# Patient Record
Sex: MALE | Race: WHITE
[De-identification: names, ages, dates, MRNs, and addresses within clinical notes are randomized per-mention and may not be internally consistent; named-entity substitution may affect disease eponyms.]

---

## 2019-11-05 ENCOUNTER — HOSPITAL ENCOUNTER (OUTPATIENT)
Dept: HOSPITAL 39 - MRI | Age: 49
End: 2019-11-05
Attending: FAMILY MEDICINE
Payer: COMMERCIAL

## 2019-11-05 DIAGNOSIS — M94.261: ICD-10-CM

## 2019-11-05 DIAGNOSIS — M76.891: ICD-10-CM

## 2019-11-05 DIAGNOSIS — S83.241A: Primary | ICD-10-CM

## 2019-11-05 NOTE — MRI
Study: MRI of the Right Knee. 



Indication: PAIN IN RIGHT KNEE  



Technique: Multiplanar, multi sequence MRI of the right knee was

obtained without intravenous contrast. 



Comparison: None.



Findings:



ACL, PCL, lateral collateral ligament complex intact. MCL is lax

and bowed indicating sequela of a prior MCL sprain. No acute

tear. 



Horizontal cleavage tear posterior horn and body medial meniscus

with additional radial free edge tearing of this site. Body

extruded by 3 mm. 



Areas of grade 2/3 chondral thinning of the medial compartment

but with mild grade 4 chondral fissuring and subchondral marrow

change at the lateral margin medial femoral condyle.



Lateral meniscus intact. No high-grade chondral defect lateral

compartment.



Large knee effusion. Moderate anterior subcutaneous edema.

Tendinosis quadriceps tendon insertion with mild interstitial

fissuring. Patellar tendon intact. Patella normally located. No

high-grade chondral defect patellofemoral compartment. No acute

fracture.



Impression:



Complex multidirectional tearing medial meniscus.



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

grade 4 chondral fissuring of the lateral margin medial femoral

condyle.



Prior MCL sprain.



Large knee effusion. 



Tendinosis quadriceps tendon insertion with mild interstitial

fissuring.



 



Electronically signed by:  Norman Barajas MD  11/5/2019 10:45 AM

[facility] Workstation: [id number]

## 2019-11-22 ENCOUNTER — HOSPITAL ENCOUNTER (OUTPATIENT)
Dept: HOSPITAL 39 - RAD | Age: 49
Discharge: HOME | End: 2019-11-22
Attending: ORTHOPAEDIC SURGERY
Payer: COMMERCIAL

## 2019-11-22 DIAGNOSIS — M25.551: ICD-10-CM

## 2019-11-22 DIAGNOSIS — M25.561: Primary | ICD-10-CM

## 2019-11-24 NOTE — RAD
EXAM DESCRIPTION: 

Pelvis: CR/DR/XR



CLINICAL HISTORY: 

PAIN IN RIGHT HIP



COMPARISON: 

None Available.



TECHNIQUE: 

One view



FINDINGS: 

No fracture dislocation. Borderline low density bilateral hip

joints are symmetric. No abnormal radiodense objects in the soft

tissues or joint spaces. Calcifications overlying the upper

midline and right scrotum. Spondylosis L5-S1.



IMPRESSION: 

Questionable loss of bone density. No acute bony or joint margin

abnormalities.



Electronically signed by:  Mc Avalos MD  11/24/2019 2:01 PM

[facility] Workstation: [id number]

## 2019-11-24 NOTE — RAD
EXAM DESCRIPTION: 

Knee,Right Complete: CR/DR/XR.



CLINICAL HISTORY: 

49 years MalePAIN IN RIGHT KNEE



COMPARISON: 

Radiographs AP pelvis.



TECHNIQUE: 

3 AP, tunnel 45 degrees flexion, and lateral all standing. Right

knee.



FINDINGS: 

Focal loss of bone density in the inner aspect of the lateral

femoral condyle, tibial spine, and anterior aspect of the medial

femoral condyle. Overall bone density slightly decreased. No

fracture lines. No abnormal radiodense objects in the soft

tissues. Periosteal reaction versus cortical spur lateral aspect

of the proximal right tibial shaft. Oval-shaped bone lesion with

well-defined sclerotic margins, posterior midline tibia just

inferior to the metadiaphysis. Small suprapatellar effusion.



IMPRESSION: 

Bone density loss versus osteochondral lesions inner aspect of

the medial and lateral femoral condyles at the articular

surfaces, and the median tibial spine, with diffuse borderline

bone density loss. Benign-appearing bone lesion in the proximal

tibia. No radiodense foreign bodies in the soft tissues or joint

spaces. Small suprapatellar effusion. No fracture lines.



Electronically signed by:  Mc Avalos MD  11/24/2019 2:08 PM

[facility] Workstation: [id number]

## 2019-11-27 ENCOUNTER — HOSPITAL ENCOUNTER (OUTPATIENT)
Dept: HOSPITAL 39 - GMA MATASK | Age: 49
End: 2019-11-27
Attending: FAMILY MEDICINE
Payer: COMMERCIAL

## 2019-11-27 DIAGNOSIS — Z00.00: Primary | ICD-10-CM

## 2020-01-06 ENCOUNTER — HOSPITAL ENCOUNTER (OUTPATIENT)
Dept: HOSPITAL 39 - GMA MATASK | Age: 50
End: 2020-01-06
Attending: FAMILY MEDICINE
Payer: COMMERCIAL

## 2020-01-06 DIAGNOSIS — E03.9: Primary | ICD-10-CM

## 2020-01-06 DIAGNOSIS — I10: ICD-10-CM

## 2020-02-03 ENCOUNTER — HOSPITAL ENCOUNTER (OUTPATIENT)
Dept: HOSPITAL 39 - US | Age: 50
End: 2020-02-03
Attending: FAMILY MEDICINE
Payer: COMMERCIAL

## 2020-02-03 DIAGNOSIS — R18.8: Primary | ICD-10-CM

## 2020-02-03 DIAGNOSIS — R16.1: ICD-10-CM

## 2020-02-03 DIAGNOSIS — K76.9: ICD-10-CM

## 2020-02-03 DIAGNOSIS — N28.9: ICD-10-CM

## 2020-02-03 DIAGNOSIS — K87: ICD-10-CM

## 2020-02-03 NOTE — US
EXAM DESCRIPTION: 

Abdomen,Complete: Ultrasound.



CLINICAL HISTORY: 

49 years MaleGENERALIZED ABDOMINAL PAIN. Alcohol use and taking

Tylenol. Jaundice.



COMPARISON: 

None Available.



TECHNIQUE: 

Transabdominal scanning: grayscale and Doppler modes.



FINDINGS: 

Ascites in all 4 quadrants of the abdomen.

Gallbladder: Contains sludge but no echogenic stones. Fluid in

Morison's pouch. Hepatic wall thickness 4.0 mm is abnormal.

Nontender with transducer pressure.    

Common bile duct: 5.4 mm normal caliber.

Liver: Echogenic with minimal lobulation of the capsule.

Physiologic vascular flow. Ducts not dilated. Ascites surrounding

the free edges of the liver. Right lobe 15 cm long axis. 

Pancreas:  Slightly echogenic, normal size with duct not seen..  

Abdominal aorta: Obscured by shadowing intestinal gas. 

IVC: visualized; normal caliber. 

Spleen normal echogenicity; long axis measurement is 14.7 cm.  

Right kidney: 10 cm long axis. Normal cortical thickness and

echogenicity. No hydronephrosis or echogenic stones. No perirenal

fluid

Left kidney: 10.3 cm long axis. Normal cortical echogenicity with

12 mm cortical thickness. No hydronephrosis or echogenic stones.

No perirenal fluid.



IMPRESSION: 

1. Ascites in all 4 quadrants of the abdomen.

2. Liver with increased echogenicity in slight lobulation of the

capsule could represent cirrhosis. No intrahepatic biliary

dilatation. Physiologic vascularity. Normal caliber of the ducts.

3. Bilateral kidneys unremarkable except for minimal thinning of

the left renal cortex.

4. Sludge in the gallbladder with minimal wall thickening which

may be secondary to ascites. Common bile duct normal caliber.

5. Splenomegaly with no focal lesions.



CRITICAL COMMUNICATION:  

The critical value was discussed directly by phone by Dr. Avalos, with Dr. Paras Varner at approximately 1305 hours, on

February 3, 2020.



Electronically signed by:  Mc Avalos MD  2/3/2020 1:26 PM CST

Workstation: [id number]